# Patient Record
Sex: MALE | Race: OTHER | HISPANIC OR LATINO | URBAN - METROPOLITAN AREA
[De-identification: names, ages, dates, MRNs, and addresses within clinical notes are randomized per-mention and may not be internally consistent; named-entity substitution may affect disease eponyms.]

---
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
## 2025-03-14 NOTE — ED PROVIDER NOTE - CROS ED CONS ALL NEG
Balar attempted to call pt to engage in C2C program. However, writer tried calling pt at [phone] and the phone is currently not working. Balar will continue to try and make efforts to reach and assist as needed with outpatient hc needs.        Flaquita Wagner MSW, APSW  [facility] Coverage to Care   [phone]   
negative...